# Patient Record
Sex: FEMALE | Employment: OTHER | URBAN - METROPOLITAN AREA
[De-identification: names, ages, dates, MRNs, and addresses within clinical notes are randomized per-mention and may not be internally consistent; named-entity substitution may affect disease eponyms.]

---

## 2017-12-07 ENCOUNTER — HOSPITAL ENCOUNTER (OUTPATIENT)
Dept: MAMMOGRAPHY | Age: 67
Discharge: HOME | End: 2017-12-07
Payer: MEDICARE

## 2017-12-07 DIAGNOSIS — Z12.39 BREAST SCREENING: ICD-10-CM

## 2017-12-07 PROCEDURE — G0202 SCR MAMMO BI INCL CAD: HCPCS

## 2018-01-05 LAB
CREATININE, EXTERNAL: 0.62
LDL-C, EXTERNAL: 169

## 2018-04-24 ENCOUNTER — OFFICE VISIT (OUTPATIENT)
Dept: ENDOCRINOLOGY | Age: 68
End: 2018-04-24

## 2018-04-24 VITALS
WEIGHT: 131 LBS | BODY MASS INDEX: 24.11 KG/M2 | SYSTOLIC BLOOD PRESSURE: 140 MMHG | HEART RATE: 60 BPM | DIASTOLIC BLOOD PRESSURE: 56 MMHG | HEIGHT: 62 IN

## 2018-04-24 DIAGNOSIS — E03.9 HYPOTHYROIDISM, ADULT: Primary | ICD-10-CM

## 2018-04-24 RX ORDER — BISMUTH SUBSALICYLATE 262 MG
1 TABLET,CHEWABLE ORAL DAILY
COMMUNITY

## 2018-04-24 RX ORDER — LEVOTHYROXINE AND LIOTHYRONINE 38; 9 UG/1; UG/1
60 TABLET ORAL DAILY
COMMUNITY
End: 2018-07-23 | Stop reason: DRUGHIGH

## 2018-04-24 RX ORDER — LEVOTHYROXINE AND LIOTHYRONINE 19; 4.5 UG/1; UG/1
30 TABLET ORAL DAILY
COMMUNITY
End: 2018-07-23 | Stop reason: DRUGHIGH

## 2018-04-24 NOTE — PATIENT INSTRUCTIONS
Hypothyroidism:    Labs on about 47 mg/day of NP thyroid (60 mg most days and 30 mg on M,W,F)    Adjust dose as indicated. Reassess in 3 months.

## 2018-04-24 NOTE — PROGRESS NOTES
Patient started NP Thyroid 60 mg everyday in January. Reduced to 30 mg Mon, Wed, Fri and 60 mg all other days in January.

## 2018-04-24 NOTE — PROGRESS NOTES
Chief Complaint   Patient presents with    Thyroid Problem    New Patient     History of Present Illness: Yunior Souza is a 79 y.o. female presents for evaluation of hypothyroidism. Diagnosed with hypothyroidism several years ago. Started treatment in fall 2017. Was tired prior to treatment. Unsure about cold intolerance. No constipation. Started on NP Thyroid 60 mg in 9/2017. TSH was low in 1/2018. Dose decreased to 30 mg on M, W, F and 60 mg on other days. After taking the 60 mg daily dose for several weeks, she felt poorly. Felt lightheaded. Had some increase in tremulousness. No major problems with anxiety. Benign essential tremor runs in family. Energy has improved since she started taking thyroid supplement. Follows a gluten free diet and feels this helped with loose stools and inflammation. Does not feel different on days when she takes 30 vs 60 mg daily. Weight is down 10 lbs since she started thyroid medication    Social:  Still working  Past Medical History:   Diagnosis Date    Hypothyroid     started medication 9/2017     Past Surgical History:   Procedure Laterality Date    HX CYST INCISION AND DRAINAGE      ? side-benign    HX HIP REPLACEMENT       Current Outpatient Prescriptions   Medication Sig    thyroid, Pork, (NP THYROID) 30 mg tablet Take 30 mg by mouth daily. 1 tablet on Mon, Wed, Fri    thyroid, Pork, (NP THYROID) 60 mg tablet Take 60 mg by mouth daily. 1 tablet on Sun, Tue, [address], Sat.  multivitamin (ONE A DAY) tablet Take 1 Tab by mouth daily.  CALCIUM PO Take  by mouth.  TURMERIC, BULK, by Does Not Apply route.  OTHER Livotrit Plus vitamin  Every other day    OTHER Beta-TCP vitamin  Every other day    OTHER      No current facility-administered medications for this visit.       No Known Allergies  Family History   Problem Relation Age of Onset    Breast Cancer Mother 72     Social History     Social History    Marital status:  Spouse name: N/A    Number of children: N/A    Years of education: N/A     Occupational History    Not on file. Social History Main Topics    Smoking status: Never Smoker    Smokeless tobacco: Never Used    Alcohol use Not on file    Drug use: Not on file    Sexual activity: Not on file     Other Topics Concern    Not on file     Social History Narrative       Review of Systems:  - Constitutional Symptoms: no fevers, chills, see HPI  - Eyes: no blurry vision or double vision  - Cardiovascular: no chest pain or palpitations  - Respiratory: no cough or shortness of breath  - Gastrointestinal: no dysphagia or abdominal pain. See HPI  - Musculoskeletal: no joint pains or weakness  - Integumentary: no rashes  - Neurological: no numbness, tingling, or headaches  - Psychiatric: no depression or anxiety  - Endocrine: no heat or cold intolerance, no polyuria or polydipsia    Physical Examination:  Visit Vitals    /56    Pulse 60    Ht 5' 2\" (1.575 m)    Wt 131 lb (59.4 kg)    BMI 23.96 kg/m2   -   - General: pleasant, no distress,   - HEENT:No scleral/conjunctival injection, EOMI,  MMM  - Neck: supple, no thyromegaly, masses, lymph nodes, no supraclavicular or dorsocervical fat pads  - Cardiovascular: regular, normal rate  - Respiratory: normal effort  - Integumentary:  no edema  - Neurological: alert  - Psychiatric: normal mood and affect    Data Reviewed:   8/2015 TSH 6.79, 10/2016 TSH 9.77, free T4 0.87 8/2017 TSH 9.12 free T4 1.06, 1/2018 TSH 0.01. Free T4 1.22  1/2018: Cholesterol 252, triglycerides 96, HDL 64,     Assessment/Plan:   1. Hypothyroidism, adult   Labs today on the equivalent of 47.5 mg per day. Adjust dose as indicated       Patient Instructions   Hypothyroidism:    Labs on about 47 mg/day of NP thyroid (60 mg most days and 30 mg on M,W,F)    Adjust dose as indicated. Reassess in 3 months.        Follow-up Disposition:  Return in about 3 months (around 7/24/2018).     Copy sent to:

## 2018-04-24 NOTE — MR AVS SNAPSHOT
[address] 
[phone] Patient: Cristiana Marcum MRN: [id number] Visit Information Date & Time Provider Department Dept. Phone Encounter #  
 4/24/2018 10:50 AM Collette Creeks, [address] [facility] Diabetes and Endocrinology [phone] Follow-up Instructions Return in about 3 months (around 7/24/2018). Upcoming Health Maintenance Date Due Hepatitis C Screening 1950 DTaP/Tdap/Td series (1 - Tdap) 12/22/1971 FOBT Q 1 YEAR AGE 50-75 12/22/2000 ZOSTER VACCINE AGE 60> 10/22/2010 GLAUCOMA SCREENING Q2Y 12/22/2015 Bone Densitometry (Dexa) Screening 12/22/2015 Pneumococcal 65+ Low/Medium Risk (1 of 2 - PCV13) 12/22/2015 Influenza Age 5 to Adult 8/1/2017 MEDICARE YEARLY EXAM 3/14/2018 BREAST CANCER SCRN MAMMOGRAM 12/7/2019 Allergies as of 4/24/2018  Review Complete On: 4/24/2018 By: Collette Creeks, MD  
 No Known Allergies Current Immunizations  Never Reviewed No immunizations on file. Not reviewed this visit You Were Diagnosed With   
  
 Codes Comments Hypothyroidism, adult    -  Primary ICD-10-CM: E03.9 ICD-9-CM: 346. 9 Vitals BP Pulse Height(growth percentile) Weight(growth percentile) BMI OB Status 140/56 60 5' 2\" (1.575 m) 131 lb (59.4 kg) 23.96 kg/m2 Postmenopausal  
 Smoking Status Never Smoker Vitals History BMI and BSA Data Body Mass Index Body Surface Area  
 23.96 kg/m 2 1.61 m 2 Preferred Pharmacy Pharmacy Name Phone [facility] [phone] Your Updated Medication List  
  
   
This list is accurate as of 4/24/18 12:01 PM.  Always use your most recent med list.  
  
  
  
  
 CALCIUM PO Take  by mouth.  
  
 multivitamin tablet Commonly known as:  ONE A DAY Take 1 Tab by mouth daily. * NP THYROID 30 mg tablet Generic drug:  thyroid (Pork) Take 30 mg by mouth daily. 1 tablet on Mon, Wed, Fri  
  
 * NP THYROID 60 mg tablet Generic drug:  thyroid (Pork) Take 60 mg by mouth daily. 1 tablet on Sun, Tue, Thur, Sat.  
  
 OTHER  
  
 OTHER Livotrit Plus vitamin Every other day OTHER Beta-TCP vitamin Every other day TURMERIC (BULK)  
by Does Not Apply route. * Notice: This list has 2 medication(s) that are the same as other medications prescribed for you. Read the directions carefully, and ask your doctor or other care provider to review them with you. We Performed the Following T3, FREE [id number] CPT(R)] T4, FREE [id number] CPT(R)] TSH 3RD GENERATION [64754 CPT(R)] Follow-up Instructions Return in about 3 months (around 7/24/2018). Patient Instructions Hypothyroidism: 
 
Labs on about 47 mg/day of NP thyroid (60 mg most days and 30 mg on M,W,F) Adjust dose as indicated. Reassess in 3 months. Introducing [facility]! Sonia Trivedi introduces KDS patient portal. Now you can access parts of your medical record, email your doctor's office, and request medication refills online. 1. In your internet browser, go to [email or web address] 2. Click on the First Time User? Click Here link in the Sign In box. You will see the New Member Sign Up page. 3. Enter your KDS Access Code exactly as it appears below. You will not need to use this code after youve completed the sign-up process. If you do not sign up before the expiration date, you must request a new code. · KDS Access Code: [email or web address] Expires: 7/23/2018 12:01 PM 
 
4. Enter the last four digits of your Social Security Number (xxxx) and Date of Birth (mm/dd/yyyy) as indicated and click Submit. You will be taken to the next sign-up page. 5. Create a KDS ID.  This will be your KDS login ID and cannot be changed, so think of one that is secure and easy to remember. 6. Create a Headplay password. You can change your password at any time. 7. Enter your Password Reset Question and Answer. This can be used at a later time if you forget your password. 8. Enter your e-mail address. You will receive e-mail notification when new information is available in [address] 9. Click Sign Up. You can now view and download portions of your medical record. 10. Click the Download Summary menu link to download a portable copy of your medical information. If you have questions, please visit the Frequently Asked Questions section of the Headplay website. Remember, Headplay is NOT to be used for urgent needs. For medical emergencies, dial 911. Now available from your iPhone and Android! Please provide this summary of care documentation to your next provider. Your primary care clinician is listed as Tyler Estrada III. If you have any questions after today's visit, please call [phone].

## 2018-04-25 LAB
T3FREE SERPL-MCNC: 4.3 PG/ML (ref 2–4.4)
T4 FREE SERPL-MCNC: 1.03 NG/DL (ref 0.82–1.77)
TSH SERPL DL<=0.005 MIU/L-ACNC: 0.35 UIU/ML (ref 0.45–4.5)

## 2018-07-23 ENCOUNTER — OFFICE VISIT (OUTPATIENT)
Dept: ENDOCRINOLOGY | Age: 68
End: 2018-07-23

## 2018-07-23 VITALS
RESPIRATION RATE: 15 BRPM | SYSTOLIC BLOOD PRESSURE: 124 MMHG | DIASTOLIC BLOOD PRESSURE: 68 MMHG | WEIGHT: 129 LBS | HEART RATE: 81 BPM | HEIGHT: 62 IN | BODY MASS INDEX: 23.74 KG/M2 | OXYGEN SATURATION: 96 %

## 2018-07-23 DIAGNOSIS — E03.9 ACQUIRED HYPOTHYROIDISM: Primary | ICD-10-CM

## 2018-07-23 RX ORDER — LEVOTHYROXINE AND LIOTHYRONINE 9.5; 2.25 UG/1; UG/1
TABLET ORAL
Qty: 100 TAB | Refills: 3 | Status: SHIPPED | OUTPATIENT
Start: 2018-07-23 | End: 2019-04-15 | Stop reason: SDUPTHER

## 2018-07-23 NOTE — MR AVS SNAPSHOT
[address] [address] 
[phone] Patient: Ap Beaulieu MRN: [id number] Visit Information Date & Time Provider Department Dept. Phone Encounter #  
 7/23/2018  2:10 PM Delfina Lyn, [address] Diabetes and Endocrinology [phone] Follow-up Instructions Return in about 3 months (around 10/23/2018). Upcoming Health Maintenance Date Due Hepatitis C Screening 1950 DTaP/Tdap/Td series (1 - Tdap) 12/22/1971 FOBT Q 1 YEAR AGE 50-75 12/22/2000 ZOSTER VACCINE AGE 60> 10/22/2010 GLAUCOMA SCREENING Q2Y 12/22/2015 Bone Densitometry (Dexa) Screening 12/22/2015 Pneumococcal 65+ Low/Medium Risk (1 of 2 - PCV13) 12/22/2015 MEDICARE YEARLY EXAM 3/14/2018 Influenza Age 5 to Adult 8/1/2018 BREAST CANCER SCRN MAMMOGRAM 12/7/2019 Allergies as of 7/23/2018  Review Complete On: 7/23/2018 By: Delfina Lyn MD  
 No Known Allergies Current Immunizations  Never Reviewed No immunizations on file. Not reviewed this visit You Were Diagnosed With   
  
 Codes Comments Acquired hypothyroidism    -  Primary ICD-10-CM: E03.9 ICD-9-CM: 138. 9 Vitals BP Pulse Resp Height(growth percentile) Weight(growth percentile) SpO2  
 124/68 (BP 1 Location: Left arm, BP Patient Position: Sitting) 81 15 5' 2\" (1.575 m) 129 lb (58.5 kg) 96% BMI OB Status Smoking Status 23.59 kg/m2 Postmenopausal Never Smoker Vitals History BMI and BSA Data Body Mass Index Body Surface Area  
 23.59 kg/m 2 1.6 m 2 Preferred Pharmacy Pharmacy Name Phone [facility] [phone] Your Updated Medication List  
  
   
This list is accurate as of 7/23/18  3:03 PM.  Always use your most recent med list.  
  
  
  
  
 CALCIUM PO Take  by mouth. multivitamin tablet Commonly known as:  ONE A DAY Take 1 Tab by mouth daily. OTHER Livotrit Plus vitamin Every other day OTHER Beta-TCP vitamin Every other day  
  
 thyroid (Pork) 15 mg tablet Commonly known as:  NP THYROID Take 1 tablet daily with the 30 mg tablets for 45 mg/day total.  
  
 TURMERIC (BULK)  
by Does Not Apply route. Prescriptions Sent to Pharmacy Refills  
 thyroid, Pork, (NP THYROID) 15 mg tablet 3 Sig: Take 1 tablet daily with the 30 mg tablets for 45 mg/day total.  
 Class: Normal  
 Pharmacy: [address] [place] [address] Shane Alegria 70 Ph #: [phone] We Performed the Following T3, FREE [id number] CPT(R)] T4, FREE [id number] CPT(R)] TSH 3RD GENERATION [08237 CPT(R)] Follow-up Instructions Return in about 3 months (around 10/23/2018). Patient Instructions Hypothyroidism: 
 
45 mg most days of the week ( 30 + 15 mg tablets) and take 30 mg only on Wednesdays and Sundays = 41 mg/day on average. Labs prior to follow-up in October. Introducing [facility]! [facility] introduces Minekey patient portal. Now you can access parts of your medical record, email your doctor's office, and request medication refills online. 1. In your internet browser, go to [email or web address] 2. Click on the First Time User? Click Here link in the Sign In box. You will see the New Member Sign Up page. 3. Enter your Minekey Access Code exactly as it appears below. You will not need to use this code after youve completed the sign-up process. If you do not sign up before the expiration date, you must request a new code. · Minekey Access Code: [email or web address] Expires: 10/21/2018  3:03 PM 
 
4. Enter the last four digits of your Social Security Number (xxxx) and Date of Birth (mm/dd/yyyy) as indicated and click Submit. You will be taken to the next sign-up page. 5. Create a Sionic Mobile ID. This will be your Sionic Mobile login ID and cannot be changed, so think of one that is secure and easy to remember. 6. Create a Sionic Mobile password. You can change your password at any time. 7. Enter your Password Reset Question and Answer. This can be used at a later time if you forget your password. 8. Enter your e-mail address. You will receive e-mail notification when new information is available in [address] 9. Click Sign Up. You can now view and download portions of your medical record. 10. Click the Download Summary menu link to download a portable copy of your medical information. If you have questions, please visit the Frequently Asked Questions section of the Sionic Mobile website. Remember, Sionic Mobile is NOT to be used for urgent needs. For medical emergencies, dial 911. Now available from your iPhone and Android! Please provide this summary of care documentation to your next provider. Your primary care clinician is listed as Elizbeth Dakins III. If you have any questions after today's visit, please call [phone].

## 2018-07-23 NOTE — PROGRESS NOTES
History of Present Illness: Ankur Fernandez is a 79 y.o. female presents for follow-up of hypothyroidism    Diagnosed with hypothyroidism several years ago. Started treatment in fall 2017. Started on NP Thyroid 60 mg in 9/2017. TSH was low in 1/2018. Dose decreased to 30 mg on M, W, F and 60 mg on other days. After taking the 60 mg daily dose for several weeks, she felt poorly. Felt lightheaded. Had some increase in tremulousness. No major problems with anxiety. Benign essential tremor runs in family. She is taking 30 mg alternating with 60 mg for several months none. This is the same that she was taking at last visit. Reviewed labs showing mildly suppressed TSH. Overall, she feels really well. Occasions, no substantial trouble sleeping, no heat intolerance. Weight is stable. Follows a gluten free diet and feels this helped with loose stools and inflammation. Weight is down 10 lbs since she started thyroid medication    Social:  Still working      Past Medical History:   Diagnosis Date    Hypothyroid     started medication 9/2017     Current Outpatient Prescriptions   Medication Sig    thyroid, Pork, (NP THYROID) 30 mg tablet Take 30 mg by mouth daily. 1 tablet on Mon, Wed, Fri    thyroid, Pork, (NP THYROID) 60 mg tablet Take 60 mg by mouth daily. 1 tablet on Sun, Tue, [address], Sat.  multivitamin (ONE A DAY) tablet Take 1 Tab by mouth daily.  CALCIUM PO Take  by mouth.  TURMERIC, BULK, by Does Not Apply route.  OTHER Livotrit Plus vitamin  Every other day    OTHER Beta-TCP vitamin  Every other day     No current facility-administered medications for this visit.       No Known Allergies    Review of Systems:  - Eyes: no blurry vision or double vision  - Cardiovascular: no chest pain  - Respiratory: no shortness of breath  - Musculoskeletal: no myalgias    Physical Examination:  Visit Vitals    /68 (BP 1 Location: Left arm, BP Patient Position: Sitting)    Pulse 81    Resp 15  Ht 5' 2\" (1.575 m)    Wt 129 lb (58.5 kg)    SpO2 96%    BMI 23.59 kg/m2   -   - General: pleasant, no distress, normal gait   HEENT: hearing intact, EOMI, clear sclera without icterus  - Cardiovascular: regular, normal rate   - Respiratory: normal effort  - Integumentary: no edema  - Psychiatric: normal mood and affect    Data Reviewed:   Component      Latest Ref Rng & Units 4/24/2018 4/24/2018 4/24/2018          12:30 PM 12:30 PM 12:30 PM   TSH      0.450 - 4.500 uIU/mL   0.347 (L)   Triiodothyronine (T3), free      2.0 - 4.4 pg/mL  4.3    T4, Free      0.82 - 1.77 ng/dL 1.03       8/2015 TSH 6.79, 10/2016 TSH 9.77, free T4 0.87 8/2017 TSH 9.12 free T4 1.06, 1/2018 TSH 0.01. Free T4 1.22  1/2018: Cholesterol 252, triglycerides 96, HDL 64,     Assessment/Plan:   1. Acquired hypothyroidism   Reviewed with her how I would like to achieve normal TSH levels to decrease risk for atrial fibrillation and osteoporosis. Discussed atrial fibrillation more common with low TSH values for hyperthyroidism. Recommend she take NP thyroid -45 mg most days and 30 mg on Wednesday and Sundays. This will average up to an average daily dose of about 41 mcg per day. Reassess labs in 3 months prior to next visit. Patient Instructions   Hypothyroidism:    45 mg most days of the week ( 30 + 15 mg tablets) and take 30 mg only on Wednesdays and Sundays = 41 mg/day on average. Labs prior to follow-up in October. Follow-up Disposition:  Return in about 3 months (around 10/23/2018).     Copy sent to:

## 2018-07-23 NOTE — PATIENT INSTRUCTIONS
Hypothyroidism:    45 mg most days of the week ( 30 + 15 mg tablets) and take 30 mg only on Wednesdays and Sundays = 41 mg/day on average. Labs prior to follow-up in October.

## 2018-07-23 NOTE — PROGRESS NOTES
1. Have you been to the ER, urgent care clinic since your last visit? Hospitalized since your last visit? No    2. Have you seen or consulted any other health care providers outside of the [facility] since your last visit? Include any pap smears or colon screening.  No     Chief Complaint   Patient presents with    Thyroid Problem     Visit Vitals    /68 (BP 1 Location: Left arm, BP Patient Position: Sitting)    Pulse 81    Resp 15    Ht 5' 2\" (1.575 m)    Wt 129 lb (58.5 kg)    SpO2 96%    BMI 23.59 kg/m2       Learning Assessment 7/23/2018   PRIMARY LEARNER Patient   PRIMARY LANGUAGE ENGLISH   LEARNER PREFERENCE PRIMARY DEMONSTRATION   ANSWERED BY patient   RELATIONSHIP SELF

## 2018-10-19 LAB
T3FREE SERPL-MCNC: 5 PG/ML (ref 2–4.4)
T4 FREE SERPL-MCNC: 1.21 NG/DL (ref 0.82–1.77)
TSH SERPL DL<=0.005 MIU/L-ACNC: 2.57 UIU/ML (ref 0.45–4.5)

## 2018-10-22 ENCOUNTER — OFFICE VISIT (OUTPATIENT)
Dept: ENDOCRINOLOGY | Age: 68
End: 2018-10-22

## 2018-10-22 VITALS
RESPIRATION RATE: 14 BRPM | SYSTOLIC BLOOD PRESSURE: 102 MMHG | HEART RATE: 62 BPM | DIASTOLIC BLOOD PRESSURE: 64 MMHG | WEIGHT: 128.6 LBS | BODY MASS INDEX: 23.66 KG/M2 | HEIGHT: 62 IN | OXYGEN SATURATION: 100 %

## 2018-10-22 DIAGNOSIS — M25.50 ARTHRALGIA, UNSPECIFIED JOINT: ICD-10-CM

## 2018-10-22 DIAGNOSIS — E03.9 HYPOTHYROIDISM, ADULT: Primary | ICD-10-CM

## 2018-10-22 NOTE — PATIENT INSTRUCTIONS
Hypothyroidism:    Continue current dosing. 45 mg most days of the week ( 30 + 15 mg tablets) and take 30 mg only on Wednesdays and Sundays = 41 mg/day on average. Labs prior to follow-up. Sooner if needed.

## 2018-10-22 NOTE — PROGRESS NOTES
Curry Avendaño is a 79 y.o. female      Chief Complaint   Patient presents with    Follow-up    Thyroid Problem    Labs     Labs in CC     1. Have you been to the ER, urgent care clinic since your last visit? Hospitalized since your last visit? No    2. Have you seen or consulted any other health care providers outside of the [facility] since your last visit? Include any pap smears or colon screening.  No

## 2018-10-22 NOTE — PROGRESS NOTES
History of Present Illness: Hilton Castellon is a 79 y.o. female presents for follow-up of hypothyroidism  Diagnosed with hypothyroidism several years ago. Started treatment in fall 2017. Started on NP Thyroid 60 mg in 9/2017. TSH was low in 1/2018 and again in 4/2018  60 mg was too much. Taking 45 mg most days and 30 mg on Wed and Sundays (avg dose 41 mg) since July 2018. TSH at goal with pre-labs. She thinks she feels a little better  Having less dizziness. No substantial trouble sleeping, no heat intolerance. Weight is stable. No palpitations. No heat/cold intolerance. Follows a gluten free diet and feels this helped with indigestion related sx (belching) and inflammation which is manifested as arthralgias. Tested negative for celiac dz. Weight is now stable. Social:  Still working      Past Medical History:   Diagnosis Date    Hypothyroid     started medication 9/2017     Current Outpatient Medications   Medication Sig    thyroid, Pork, (NP THYROID) 15 mg tablet Take 1 tablet daily with the 30 mg tablets for 45 mg/day total.    multivitamin (ONE A DAY) tablet Take 1 Tab by mouth daily.  CALCIUM PO Take  by mouth.  TURMERIC, BULK, by Does Not Apply route.  OTHER Livotrit Plus vitamin  Every other day    OTHER Beta-TCP vitamin  Every other day     No current facility-administered medications for this visit.       No Known Allergies    Review of Systems:  - Eyes: no blurry vision or double vision  - Cardiovascular: no chest pain  - Respiratory: no shortness of breath  - Musculoskeletal: see HPI    Physical Examination:  Visit Vitals  /64 (BP 1 Location: Right arm, BP Patient Position: Sitting)   Pulse 62   Resp 14   Ht 5' 2\" (1.575 m)   Wt 128 lb 9.6 oz (58.3 kg)   SpO2 100%   BMI 23.52 kg/m²     - General: pleasant, no distress, normal gait   HEENT: hearing intact, EOMI, clear sclera without icterus  - Cardiovascular: regular, normal rate   - Respiratory: normal effort  - Integumentary: no edema  - Psychiatric: normal mood and affect    Data Reviewed:   Component      Latest Ref Rng & Units 10/18/2018 10/18/2018 10/18/2018 4/24/2018           9:28 AM  9:28 AM  9:28 AM 12:30 PM   TSH      0.450 - 4.500 uIU/mL 2.570      Triiodothyronine (T3), free      2.0 - 4.4 pg/mL   5.0 (H)    T4, Free      0.82 - 1.77 ng/dL  1.21  1.03     Component      Latest Ref Rng & Units 4/24/2018 4/24/2018          12:30 PM 12:30 PM   TSH      0.450 - 4.500 uIU/mL  0.347 (L)   Triiodothyronine (T3), free      2.0 - 4.4 pg/mL 4.3    T4, Free      0.82 - 1.77 ng/dL         Assessment/Plan:   1. Hypothyroidism, adult   - continue Anna 45 mg most days and 30 mg on Wed and Sundays for 41 mg daily   2. Arthralgia, unspecified joint   - will check CRP with pre-labs, per her request and concerns about inflammation. Patient Instructions   Hypothyroidism:    Continue current dosing. 45 mg most days of the week ( 30 + 15 mg tablets) and take 30 mg only on Wednesdays and Sundays = 41 mg/day on average. Labs prior to follow-up. Sooner if needed. Follow-up Disposition:  Return in about 6 months (around 4/22/2019).

## 2018-12-11 ENCOUNTER — HOSPITAL ENCOUNTER (OUTPATIENT)
Dept: MAMMOGRAPHY | Age: 68
Discharge: HOME | End: 2018-12-11
Payer: MEDICARE

## 2018-12-11 DIAGNOSIS — Z12.39 SCREENING BREAST EXAMINATION: ICD-10-CM

## 2018-12-11 PROCEDURE — 77067 SCR MAMMO BI INCL CAD: CPT

## 2019-04-05 ENCOUNTER — TELEPHONE (OUTPATIENT)
Dept: ENDOCRINOLOGY | Age: 69
End: 2019-04-05

## 2019-04-05 DIAGNOSIS — E03.9 ACQUIRED HYPOTHYROIDISM: ICD-10-CM

## 2019-04-05 DIAGNOSIS — M25.50 ARTHRALGIA, UNSPECIFIED JOINT: Primary | ICD-10-CM

## 2019-04-05 DIAGNOSIS — E78.9 SERUM CHOLESTEROL ELEVATED: ICD-10-CM

## 2019-04-05 NOTE — TELEPHONE ENCOUNTER
4/5/2019  11:30 AM    Patient called in stating she would like extra labs called in for full blood work to check her cholesterol. She would like a call back.

## 2019-04-05 NOTE — TELEPHONE ENCOUNTER
Mrs. Liv Osorio called and stated that she would like for Dr. Gail Romero to order labs to check her glucose levels, liver, and cholesterol levels. I informed her that Dr. Gail Romero and Chema Andersen are both off until Monday and that I will send a message to both and that she should hear something from them on Monday when they get back. Patient understood with no further questions.

## 2019-04-08 NOTE — TELEPHONE ENCOUNTER
Informed Mrs. Cuellar that her labs were ordered. Dr. Margareth Schuler,    Katiana Hermann Irvin would like to know if she should hold off on taking her thyroid medication Wednesday morning until after she has had her labs drawn?

## 2019-04-09 NOTE — TELEPHONE ENCOUNTER
Informed Mrs. Cuellar that per Dr. Valeri Osgood, to take her thyroid medication after she has had her labs done. Patient understood with no further questions.

## 2019-04-11 LAB
ALBUMIN SERPL-MCNC: 4.5 G/DL (ref 3.6–4.8)
ALBUMIN/GLOB SERPL: 2.3 {RATIO} (ref 1.2–2.2)
ALP SERPL-CCNC: 60 IU/L (ref 39–117)
ALT SERPL-CCNC: 29 IU/L (ref 0–32)
AST SERPL-CCNC: 27 IU/L (ref 0–40)
BILIRUB SERPL-MCNC: 0.5 MG/DL (ref 0–1.2)
BUN SERPL-MCNC: 18 MG/DL (ref 8–27)
BUN/CREAT SERPL: 22 (ref 12–28)
CALCIUM SERPL-MCNC: 9.7 MG/DL (ref 8.7–10.3)
CHLORIDE SERPL-SCNC: 103 MMOL/L (ref 96–106)
CHOLEST SERPL-MCNC: 226 MG/DL (ref 100–199)
CO2 SERPL-SCNC: 24 MMOL/L (ref 20–29)
CREAT SERPL-MCNC: 0.83 MG/DL (ref 0.57–1)
CRP SERPL HS-MCNC: 2.14 MG/L (ref 0–3)
GLOBULIN SER CALC-MCNC: 2 G/DL (ref 1.5–4.5)
GLUCOSE SERPL-MCNC: 96 MG/DL (ref 65–99)
HDLC SERPL-MCNC: 71 MG/DL
LDLC SERPL CALC-MCNC: 140 MG/DL (ref 0–99)
POTASSIUM SERPL-SCNC: 4.4 MMOL/L (ref 3.5–5.2)
PROT SERPL-MCNC: 6.5 G/DL (ref 6–8.5)
SODIUM SERPL-SCNC: 143 MMOL/L (ref 134–144)
T3FREE SERPL-MCNC: 2.5 PG/ML (ref 2–4.4)
T4 FREE SERPL-MCNC: 0.98 NG/DL (ref 0.82–1.77)
TRIGL SERPL-MCNC: 75 MG/DL (ref 0–149)
TSH SERPL DL<=0.005 MIU/L-ACNC: 1.71 UIU/ML (ref 0.45–4.5)
VLDLC SERPL CALC-MCNC: 15 MG/DL (ref 5–40)

## 2019-04-15 ENCOUNTER — OFFICE VISIT (OUTPATIENT)
Dept: ENDOCRINOLOGY | Age: 69
End: 2019-04-15

## 2019-04-15 VITALS
HEIGHT: 62 IN | DIASTOLIC BLOOD PRESSURE: 63 MMHG | HEART RATE: 65 BPM | SYSTOLIC BLOOD PRESSURE: 104 MMHG | WEIGHT: 130.6 LBS | BODY MASS INDEX: 24.03 KG/M2

## 2019-04-15 DIAGNOSIS — E03.9 ACQUIRED HYPOTHYROIDISM: Primary | ICD-10-CM

## 2019-04-15 DIAGNOSIS — E78.9 SERUM CHOLESTEROL ELEVATED: ICD-10-CM

## 2019-04-15 RX ORDER — LEVOTHYROXINE, LIOTHYRONINE 9.5; 2.25 UG/1; UG/1
TABLET ORAL
Qty: 100 TAB | Refills: 3 | Status: SHIPPED | OUTPATIENT
Start: 2019-04-15 | End: 2019-04-19 | Stop reason: SDUPTHER

## 2019-04-15 RX ORDER — FLAXSEED OIL 1000 MG
CAPSULE ORAL
COMMUNITY

## 2019-04-15 RX ORDER — TITANIUM DIOXIDE, OCTINOXATE, ZINC OXIDE 4.61; 1.6; .78 G/40ML; G/40ML; G/40ML
CREAM TOPICAL
COMMUNITY

## 2019-04-15 NOTE — PATIENT INSTRUCTIONS
Hypothyroidism:    Continue current dosing. 45 mg most days of the week ( 30 + 15 mg tablets) and take 30 mg only on Wednesdays and Sundays = 41 mg/day on average. Cholesterol:  Would see how values compare to prior values    Could consider Coronary Artery Calcification (CAC) testing - CT scan without contrast of heart.

## 2019-04-15 NOTE — PROGRESS NOTES
History of Present Illness: Pamella De Oliveira is a 76 y.o. female presents for follow-up of hypothyriodism  Diagnosed with hypothyroidism several years ago. Started treatment in fall 2017. Started on NP Thyroid 60 mg in 9/2017. TSH was low in 1/2018 and again in 4/2018  60 mg was too much. Taking 45 mg most days and 30 mg on Wed and Sundays (avg dose 41 mg) since July 2018. TSH at goal with pre-labs. Overall feels well. No substantial trouble sleeping, no heat intolerance. Weight is stable. No palpitations. No heat/cold intolerance. Follows a gluten free diet and feels this helped with indigestion related sx (belching) and inflammation Tested negative for celiac dz. Weight is now stable. Cholesterol reviewed - much improved. Social:  Still working    Past Medical History:   Diagnosis Date    Hypothyroid     started medication 9/2017     Current Outpatient Medications   Medication Sig    cranberry 400 mg cap cranberry 400 mg capsule   Take 1 capsule every day by oral route.  flaxseed oil 1,000 mg cap flaxseed oil 1,000 mg capsule   Take 1 capsule every day by oral route.  thyroid, Pork, (NP THYROID) 15 mg tablet Take 1 tablet daily with the 30 mg tablets for 45 mg/day total.    multivitamin (ONE A DAY) tablet Take 1 Tab by mouth daily.  CALCIUM PO Take  by mouth.  TURMERIC, BULK, by Does Not Apply route. No current facility-administered medications for this visit.       No Known Allergies    Review of Systems:  - Eyes: no blurry vision or double vision  - Cardiovascular: no chest pain  - Respiratory: no shortness of breath  - Musculoskeletal: no myalgias  - Neurological: no numbness/tingling in extremities    Physical Examination:  Visit Vitals  /63 (BP 1 Location: Left arm, BP Patient Position: Sitting)   Pulse 65   Ht 5' 2\" (1.575 m)   Wt 130 lb 9.6 oz (59.2 kg)   BMI 23.89 kg/m²   -   - General: pleasant, no distress, normal gait   HEENT: hearing intact, EOMI, clear sclera without icterus  - Neck: no thyromegaly or LAD  - Cardiovascular: regular, normal rate   - Respiratory: normal effort  - Integumentary: no edema  - Psychiatric: normal mood and affect    Data Reviewed:   No results found for: HBA1C, EFL8WKET, YXK5HIDH   Lab Results   Component Value Date/Time    Sodium 143 04/10/2019 09:23 AM    Potassium 4.4 04/10/2019 09:23 AM    Creatinine 0.83 04/10/2019 09:23 AM    Creatinine, External 0.62 01/05/2018        Lab Results   Component Value Date/Time    Cholesterol, total 226 04/10/2019 09:23 AM    HDL Cholesterol 71 04/10/2019 09:23 AM    LDL, calculated 140 04/10/2019 09:23 AM    LDL-C, External 169 01/05/2018    Triglyceride 75 04/10/2019 09:23 AM      Lab Results   Component Value Date/Time    TSH 1.710 04/10/2019 09:23 AM    T4, Free 0.98 04/10/2019 09:23 AM        Assessment/Plan:   1. Acquired hypothyroidism   - continue average daily dose of 41 mg/day - 45 mg most days and 30 mg two days. 2. Serum cholesterol elevated   - improved. Continue exercise, diet and weight loss efforts. Could consider coronary artery calcification score testing. Patient Instructions   Hypothyroidism:    Continue current dosing. 45 mg most days of the week ( 30 + 15 mg tablets) and take 30 mg only on Wednesdays and Sundays = 41 mg/day on average. Cholesterol:  Would see how values compare to prior values    Could consider Coronary Artery Calcification (CAC) testing - CT scan without contrast of heart. Follow-up and Dispositions    · Return in about 1 year (around 4/15/2020).            Copy sent to:

## 2019-12-02 DIAGNOSIS — E03.9 ACQUIRED HYPOTHYROIDISM: ICD-10-CM

## 2019-12-02 RX ORDER — LEVOTHYROXINE, LIOTHYRONINE 19; 4.5 UG/1; UG/1
30 TABLET ORAL DAILY
Qty: 100 TAB | Refills: 3 | Status: SHIPPED | OUTPATIENT
Start: 2019-12-02

## 2019-12-02 RX ORDER — LEVOTHYROXINE, LIOTHYRONINE 9.5; 2.25 UG/1; UG/1
TABLET ORAL
Qty: 100 TAB | Refills: 3 | Status: SHIPPED | OUTPATIENT
Start: 2019-12-02

## 2019-12-02 NOTE — TELEPHONE ENCOUNTER
12/2/2019  11:13 AM        MsKatiana  Alisa called stating that she would like us the authentic [place] thyroid tabs for both prescriptions       [place] THYROID 30 mg tablet     [place] THYROID 15 mg tablet     [facility] [address]

## 2019-12-12 ENCOUNTER — TELEPHONE (OUTPATIENT)
Dept: ENDOCRINOLOGY | Age: 69
End: 2019-12-12

## 2019-12-12 NOTE — TELEPHONE ENCOUNTER
Received  A fax from pharmacy stating that patient's NP Thyroid 30 mg was no longer on formulary. Sending this message to Dr. Gordon Baumann to advise him of this and see if he would like to change the medication.

## 2019-12-12 NOTE — TELEPHONE ENCOUNTER
Shania,  Please discuss with Mrs Luis Hummel. She could pay cash for medication OR she/we could see if another desiccated thyroid product is covered, such as [place] 30 mg.       Thank you

## 2019-12-13 ENCOUNTER — HOSPITAL ENCOUNTER (OUTPATIENT)
Dept: MAMMOGRAPHY | Age: 69
Discharge: HOME | End: 2019-12-13
Payer: MEDICARE

## 2019-12-13 DIAGNOSIS — Z12.31 VISIT FOR SCREENING MAMMOGRAM: ICD-10-CM

## 2019-12-13 PROCEDURE — 77067 SCR MAMMO BI INCL CAD: CPT

## 2019-12-13 NOTE — TELEPHONE ENCOUNTER
Called pt mobile phone. Left message in regards to Thyroid medication. Advised pt to please return my call on Monday. Awaiting return call.

## 2020-12-03 ENCOUNTER — TRANSCRIBE ORDER (OUTPATIENT)
Dept: SCHEDULING | Age: 70
End: 2020-12-03

## 2020-12-03 DIAGNOSIS — Z12.31 VISIT FOR SCREENING MAMMOGRAM: Primary | ICD-10-CM

## 2020-12-19 ENCOUNTER — TRANSCRIBE ORDER (OUTPATIENT)
Dept: EMERGENCY DEPT | Age: 70
End: 2020-12-19

## 2020-12-19 ENCOUNTER — HOSPITAL ENCOUNTER (OUTPATIENT)
Dept: MAMMOGRAPHY | Age: 70
Discharge: HOME | End: 2020-12-19
Attending: INTERNAL MEDICINE
Payer: MEDICARE

## 2020-12-19 DIAGNOSIS — Z12.31 VISIT FOR SCREENING MAMMOGRAM: ICD-10-CM

## 2020-12-19 DIAGNOSIS — Z12.31 VISIT FOR SCREENING MAMMOGRAM: Primary | ICD-10-CM

## 2020-12-19 PROCEDURE — 77063 BREAST TOMOSYNTHESIS BI: CPT

## 2022-11-04 ENCOUNTER — TRANSCRIBE ORDER (OUTPATIENT)
Dept: SCHEDULING | Age: 72
End: 2022-11-04

## 2022-11-04 DIAGNOSIS — Z12.31 SCREENING MAMMOGRAM FOR HIGH-RISK PATIENT: Primary | ICD-10-CM

## 2022-12-28 ENCOUNTER — HOSPITAL ENCOUNTER (OUTPATIENT)
Dept: MAMMOGRAPHY | Age: 72
Discharge: HOME | End: 2022-12-28
Attending: FAMILY MEDICINE
Payer: MEDICARE

## 2022-12-28 DIAGNOSIS — Z12.31 SCREENING MAMMOGRAM FOR HIGH-RISK PATIENT: ICD-10-CM

## 2022-12-28 PROCEDURE — 77063 BREAST TOMOSYNTHESIS BI: CPT

## 2023-12-11 ENCOUNTER — TRANSCRIBE ORDERS (OUTPATIENT)
Facility: HOSPITAL | Age: 73
End: 2023-12-11

## 2023-12-11 DIAGNOSIS — Z12.31 VISIT FOR SCREENING MAMMOGRAM: Primary | ICD-10-CM

## 2024-01-04 ENCOUNTER — HOSPITAL ENCOUNTER (OUTPATIENT)
Facility: HOSPITAL | Age: 74
Discharge: HOME | End: 2024-01-04
Payer: MEDICARE

## 2024-01-04 DIAGNOSIS — Z12.31 VISIT FOR SCREENING MAMMOGRAM: ICD-10-CM

## 2024-01-04 PROCEDURE — 77067 SCR MAMMO BI INCL CAD: CPT

## 2024-01-04 PROCEDURE — 77063 BREAST TOMOSYNTHESIS BI: CPT

## 2024-12-18 ENCOUNTER — TRANSCRIBE ORDERS (OUTPATIENT)
Facility: HOSPITAL | Age: 74
End: 2024-12-18

## 2024-12-18 DIAGNOSIS — Z12.31 VISIT FOR SCREENING MAMMOGRAM: Primary | ICD-10-CM

## 2025-01-14 ENCOUNTER — HOSPITAL ENCOUNTER (OUTPATIENT)
Facility: HOSPITAL | Age: 75
Discharge: HOME | End: 2025-01-17
Payer: MEDICARE

## 2025-01-14 DIAGNOSIS — Z12.31 VISIT FOR SCREENING MAMMOGRAM: ICD-10-CM

## 2025-01-14 PROCEDURE — 77063 BREAST TOMOSYNTHESIS BI: CPT
